# Patient Record
Sex: FEMALE
[De-identification: names, ages, dates, MRNs, and addresses within clinical notes are randomized per-mention and may not be internally consistent; named-entity substitution may affect disease eponyms.]

---

## 2024-10-23 ENCOUNTER — DASHBOARD ENCOUNTERS (OUTPATIENT)
Age: 39
End: 2024-10-23

## 2024-10-23 ENCOUNTER — LAB OUTSIDE AN ENCOUNTER (OUTPATIENT)
Dept: URBAN - METROPOLITAN AREA CLINIC 107 | Facility: CLINIC | Age: 39
End: 2024-10-23

## 2024-10-23 ENCOUNTER — OFFICE VISIT (OUTPATIENT)
Dept: URBAN - METROPOLITAN AREA CLINIC 107 | Facility: CLINIC | Age: 39
End: 2024-10-23
Payer: OTHER GOVERNMENT

## 2024-10-23 VITALS
WEIGHT: 173.8 LBS | HEART RATE: 57 BPM | OXYGEN SATURATION: 99 % | DIASTOLIC BLOOD PRESSURE: 70 MMHG | RESPIRATION RATE: 18 BRPM | SYSTOLIC BLOOD PRESSURE: 107 MMHG | TEMPERATURE: 97.9 F | HEIGHT: 67 IN | BODY MASS INDEX: 27.28 KG/M2

## 2024-10-23 DIAGNOSIS — K60.1 CHRONIC POSTERIOR ANAL FISSURE: ICD-10-CM

## 2024-10-23 DIAGNOSIS — R14.0 ABDOMINAL BLOATING: ICD-10-CM

## 2024-10-23 DIAGNOSIS — R19.4 ALTERED BOWEL HABITS: ICD-10-CM

## 2024-10-23 DIAGNOSIS — K62.5 BRIGHT RED BLOOD PER RECTUM: ICD-10-CM

## 2024-10-23 DIAGNOSIS — R10.12 LEFT UPPER QUADRANT PAIN: ICD-10-CM

## 2024-10-23 PROBLEM — 197152000: Status: ACTIVE | Noted: 2024-10-23

## 2024-10-23 PROCEDURE — 99204 OFFICE O/P NEW MOD 45 MIN: CPT | Performed by: NURSE PRACTITIONER

## 2024-10-23 NOTE — EXAM-PHYSICAL EXAM
External skin tag anteriorly.  Chronic appearing anal fissure posteriorly. Anoscopy not performed.  AMMON: No masses. Tenderness to fissure. No active bleeding.  Chaperone present.

## 2024-10-23 NOTE — HPI-TODAY'S VISIT:
39-year-old female new to the clinic here for hemorrhoids.  She had anal fissure that is mostly healed, saw PCP at [facility] 2 weeks ago and was told to start a stool softener.  Has hemorrhoids, occasionally bleed and itches will also have blood in her stool. She denies hard stools.  Has some fecal leakage. No melena.  Stool has been skinny for 4-5 months.  Feels bloated, weight gain recently.  No recent labs. Does have a history of anemia with pregnancy.  Had some episodes of heart racing has referral for cardiology seeing next week-she doesn't know the name.    No prior colonoscopy  LMP: Denies current pregnancy

## 2024-10-24 ENCOUNTER — TELEPHONE ENCOUNTER (OUTPATIENT)
Dept: URBAN - METROPOLITAN AREA CLINIC 113 | Facility: CLINIC | Age: 39
End: 2024-10-24

## 2024-10-28 ENCOUNTER — TELEPHONE ENCOUNTER (OUTPATIENT)
Dept: URBAN - METROPOLITAN AREA CLINIC 113 | Facility: CLINIC | Age: 39
End: 2024-10-28

## 2024-11-14 ENCOUNTER — TELEPHONE ENCOUNTER (OUTPATIENT)
Dept: URBAN - METROPOLITAN AREA CLINIC 107 | Facility: CLINIC | Age: 39
End: 2024-11-14

## 2024-11-15 ENCOUNTER — LAB OUTSIDE AN ENCOUNTER (OUTPATIENT)
Dept: URBAN - METROPOLITAN AREA CLINIC 107 | Facility: CLINIC | Age: 39
End: 2024-11-15

## 2024-11-18 ENCOUNTER — TELEPHONE ENCOUNTER (OUTPATIENT)
Dept: URBAN - METROPOLITAN AREA CLINIC 107 | Facility: CLINIC | Age: 39
End: 2024-11-18

## 2024-11-18 RX ORDER — SODIUM, POTASSIUM,MAG SULFATES 17.5-3.13G
AS DIRECTED SOLUTION, RECONSTITUTED, ORAL ORAL
Qty: 1 KIT | OUTPATIENT
Start: 2024-11-18

## 2024-12-09 ENCOUNTER — WEB ENCOUNTER (OUTPATIENT)
Dept: URBAN - METROPOLITAN AREA CLINIC 107 | Facility: CLINIC | Age: 39
End: 2024-12-09

## 2024-12-20 ENCOUNTER — OFFICE VISIT (OUTPATIENT)
Dept: URBAN - METROPOLITAN AREA SURGERY CENTER 25 | Facility: SURGERY CENTER | Age: 39
End: 2024-12-20

## 2025-01-03 ENCOUNTER — OFFICE VISIT (OUTPATIENT)
Dept: URBAN - METROPOLITAN AREA SURGERY CENTER 25 | Facility: SURGERY CENTER | Age: 40
End: 2025-01-03
Payer: OTHER GOVERNMENT

## 2025-01-03 ENCOUNTER — CLAIMS CREATED FROM THE CLAIM WINDOW (OUTPATIENT)
Dept: URBAN - METROPOLITAN AREA CLINIC 4 | Facility: CLINIC | Age: 40
End: 2025-01-03
Payer: OTHER GOVERNMENT

## 2025-01-03 DIAGNOSIS — R19.4 CHANGE IN BOWEL HABIT: ICD-10-CM

## 2025-01-03 DIAGNOSIS — D12.3 BENIGN NEOPLASM OF TRANSVERSE COLON: ICD-10-CM

## 2025-01-03 DIAGNOSIS — D12.3 ADENOMA OF TRANSVERSE COLON: ICD-10-CM

## 2025-01-03 DIAGNOSIS — K62.5 RECTAL BLEEDING: ICD-10-CM

## 2025-01-03 PROCEDURE — 45385 COLONOSCOPY W/LESION REMOVAL: CPT | Performed by: INTERNAL MEDICINE

## 2025-01-03 PROCEDURE — 88305 TISSUE EXAM BY PATHOLOGIST: CPT | Performed by: PATHOLOGY

## 2025-01-03 RX ORDER — SODIUM, POTASSIUM,MAG SULFATES 17.5-3.13G
AS DIRECTED SOLUTION, RECONSTITUTED, ORAL ORAL
Qty: 1 KIT | Status: ACTIVE | COMMUNITY
Start: 2024-11-18

## 2025-02-07 ENCOUNTER — OFFICE VISIT (OUTPATIENT)
Dept: URBAN - METROPOLITAN AREA CLINIC 113 | Facility: CLINIC | Age: 40
End: 2025-02-07
Payer: OTHER GOVERNMENT

## 2025-02-07 VITALS
BODY MASS INDEX: 26.46 KG/M2 | HEART RATE: 68 BPM | DIASTOLIC BLOOD PRESSURE: 79 MMHG | RESPIRATION RATE: 18 BRPM | SYSTOLIC BLOOD PRESSURE: 104 MMHG | WEIGHT: 168.6 LBS | TEMPERATURE: 98.8 F | HEIGHT: 67 IN

## 2025-02-07 DIAGNOSIS — Z86.0101 HISTORY OF ADENOMATOUS POLYP OF COLON: ICD-10-CM

## 2025-02-07 DIAGNOSIS — K60.2 ANAL FISSURE: ICD-10-CM

## 2025-02-07 PROBLEM — 429047008: Status: ACTIVE | Noted: 2025-02-07

## 2025-02-07 PROBLEM — 30037006: Status: ACTIVE | Noted: 2025-02-07

## 2025-02-07 PROCEDURE — 99213 OFFICE O/P EST LOW 20 MIN: CPT | Performed by: INTERNAL MEDICINE

## 2025-02-07 RX ORDER — SODIUM, POTASSIUM,MAG SULFATES 17.5-3.13G
AS DIRECTED SOLUTION, RECONSTITUTED, ORAL ORAL
Qty: 1 KIT | Status: ACTIVE | COMMUNITY
Start: 2024-11-18

## 2025-02-07 NOTE — HPI-TODAY'S VISIT:
39-year-old woman with history of chronic anal fissure presents for follow-up after colonoscopy.  She was initially seen by SHERRI in October 2024 as a consult for hemorrhoids.  At the time she stated that the anal fissure had mostly healed.  She had recently started a stool softener.  She was having occasional bright red blood per rectum and perianal itching which she attributed to hemorrhoids.  Also complaining of change in stool caliber, bloating, and weight gain.  Colonoscopy performed 1/3/2025.  This revealed a posterior anal fissure.  3 mm tubular adenoma in the transverse colon.  No internal or external hemorrhoids.  Recommended repeat colonoscopy in 5 years for surveillance.  Difficult to clean after BM.  symptoms began about 1-2 years ago.  painful bm, particularly after not going for a day or two.  no fiber.  usually has 1 bm per day.  occasionally will go 4-5 days without bm and then usually will have painful movement.

## 2025-04-11 ENCOUNTER — OFFICE VISIT (OUTPATIENT)
Dept: URBAN - METROPOLITAN AREA CLINIC 113 | Facility: CLINIC | Age: 40
End: 2025-04-11

## 2025-04-11 NOTE — HPI-TODAY'S VISIT:
39-year-old woman presenting for follow-up of chronic anal fissure.  My last visit with her was 2/7/2025.  At that time, I recommended bowel regularity, nitroglycerin/lidocaine ointment twice daily for 2 weeks, Benefiber twice a day, daily kiwi. Colonoscopy 1/3/2025 done for rectal bleeding and change in bowel habits.  Posterior anal fissure.  No external or internal hemorrhoids.  3 mm polyp in the transverse colon which was a tubular adenoma.  Recommended repeat colonoscopy in January 2030.

## 2025-05-09 ENCOUNTER — OFFICE VISIT (OUTPATIENT)
Dept: URBAN - METROPOLITAN AREA CLINIC 113 | Facility: CLINIC | Age: 40
End: 2025-05-09
Payer: OTHER GOVERNMENT

## 2025-05-09 DIAGNOSIS — Z86.0101 HISTORY OF ADENOMATOUS POLYP OF COLON: ICD-10-CM

## 2025-05-09 DIAGNOSIS — K60.1 CHRONIC POSTERIOR ANAL FISSURE: ICD-10-CM

## 2025-05-09 DIAGNOSIS — R19.4 ALTERED BOWEL HABITS: ICD-10-CM

## 2025-05-09 PROBLEM — 88111009: Status: ACTIVE | Noted: 2025-05-09

## 2025-05-09 PROCEDURE — 99213 OFFICE O/P EST LOW 20 MIN: CPT | Performed by: INTERNAL MEDICINE

## 2025-05-09 RX ORDER — SODIUM, POTASSIUM,MAG SULFATES 17.5-3.13G
AS DIRECTED SOLUTION, RECONSTITUTED, ORAL ORAL
Qty: 1 KIT | Status: ON HOLD | COMMUNITY
Start: 2024-11-18

## 2025-05-09 NOTE — PHYSICAL EXAM CONSTITUTIONAL:
well developed, well nourished, no acute distress, ambulating without difficulty, normal communication ability

## 2025-05-09 NOTE — PHYSICAL EXAM GASTROINTESTINAL
no abdominal scars or hernias, normal bowel sounds, abdomen soft, nontender, nondistended, no masses palpable, no palpable hepatosplenomegaly

## 2025-05-09 NOTE — HPI-TODAY'S VISIT:
39-year-old woman presenting for follow-up of chronic anal fissure.  My last visit with her was 2/7/2025.  At that time, I recommended bowel regularity, nitroglycerin/lidocaine ointment twice daily for 2 weeks, Benefiber twice a day, daily kiwi.  Colonoscopy 1/3/2025 done for rectal bleeding and change in bowel habits.  Posterior anal fissure.  No external or internal hemorrhoids.  3 mm polyp in the transverse colon which was a tubular adenoma.  Recommended repeat colonoscopy in January 2030.  Went to ER with severe abd pain about 1 mo ago.  dx with UTI.  lower abd pain radiating to low back.  CT done to eval for kidney stone which was negative.    She did not do any of the treatments for the anal fissure but does not think that she has a fissure any more.  She has soft bm and excessive wiping.  Weight stable.  No n/v. no further abd pain.  No blood in stool.  no pain with defecation.